# Patient Record
Sex: FEMALE | Race: WHITE | URBAN - METROPOLITAN AREA
[De-identification: names, ages, dates, MRNs, and addresses within clinical notes are randomized per-mention and may not be internally consistent; named-entity substitution may affect disease eponyms.]

---

## 2017-08-03 ENCOUNTER — EMERGENCY (EMERGENCY)
Facility: HOSPITAL | Age: 52
LOS: 1 days | Discharge: HOME | End: 2017-08-03
Attending: EMERGENCY MEDICINE
Payer: COMMERCIAL

## 2017-08-03 VITALS
HEIGHT: 62 IN | RESPIRATION RATE: 18 BRPM | OXYGEN SATURATION: 100 % | TEMPERATURE: 98 F | DIASTOLIC BLOOD PRESSURE: 77 MMHG | WEIGHT: 108.03 LBS | HEART RATE: 70 BPM | SYSTOLIC BLOOD PRESSURE: 133 MMHG

## 2017-08-03 VITALS
RESPIRATION RATE: 18 BRPM | HEART RATE: 62 BPM | DIASTOLIC BLOOD PRESSURE: 80 MMHG | SYSTOLIC BLOOD PRESSURE: 104 MMHG | OXYGEN SATURATION: 100 %

## 2017-08-03 LAB
ALBUMIN SERPL ELPH-MCNC: 4.5 G/DL — SIGNIFICANT CHANGE UP (ref 3.3–5.2)
ALP SERPL-CCNC: 81 U/L — SIGNIFICANT CHANGE UP (ref 40–120)
ALT FLD-CCNC: 15 U/L — SIGNIFICANT CHANGE UP
ANION GAP SERPL CALC-SCNC: 14 MMOL/L — SIGNIFICANT CHANGE UP (ref 5–17)
APTT BLD: 30.3 SEC — SIGNIFICANT CHANGE UP (ref 27.5–37.4)
AST SERPL-CCNC: 18 U/L — SIGNIFICANT CHANGE UP
BASOPHILS # BLD AUTO: 0 K/UL — SIGNIFICANT CHANGE UP (ref 0–0.2)
BASOPHILS NFR BLD AUTO: 0.6 % — SIGNIFICANT CHANGE UP (ref 0–2)
BILIRUB SERPL-MCNC: 0.3 MG/DL — LOW (ref 0.4–2)
BUN SERPL-MCNC: 14 MG/DL — SIGNIFICANT CHANGE UP (ref 8–20)
CALCIUM SERPL-MCNC: 9.3 MG/DL — SIGNIFICANT CHANGE UP (ref 8.6–10.2)
CHLORIDE SERPL-SCNC: 100 MMOL/L — SIGNIFICANT CHANGE UP (ref 98–107)
CO2 SERPL-SCNC: 28 MMOL/L — SIGNIFICANT CHANGE UP (ref 22–29)
CREAT SERPL-MCNC: 0.56 MG/DL — SIGNIFICANT CHANGE UP (ref 0.5–1.3)
EOSINOPHIL # BLD AUTO: 0.1 K/UL — SIGNIFICANT CHANGE UP (ref 0–0.5)
EOSINOPHIL NFR BLD AUTO: 2.4 % — SIGNIFICANT CHANGE UP (ref 0–6)
GLUCOSE SERPL-MCNC: 99 MG/DL — SIGNIFICANT CHANGE UP (ref 70–115)
HCT VFR BLD CALC: 35.4 % — LOW (ref 37–47)
HGB BLD-MCNC: 12 G/DL — SIGNIFICANT CHANGE UP (ref 12–16)
INR BLD: 1.08 RATIO — SIGNIFICANT CHANGE UP (ref 0.88–1.16)
LYMPHOCYTES # BLD AUTO: 1.3 K/UL — SIGNIFICANT CHANGE UP (ref 1–4.8)
LYMPHOCYTES # BLD AUTO: 23.6 % — SIGNIFICANT CHANGE UP (ref 20–55)
MCHC RBC-ENTMCNC: 30.8 PG — SIGNIFICANT CHANGE UP (ref 27–31)
MCHC RBC-ENTMCNC: 33.9 G/DL — SIGNIFICANT CHANGE UP (ref 32–36)
MCV RBC AUTO: 90.8 FL — SIGNIFICANT CHANGE UP (ref 81–99)
MONOCYTES # BLD AUTO: 0.3 K/UL — SIGNIFICANT CHANGE UP (ref 0–0.8)
MONOCYTES NFR BLD AUTO: 6.4 % — SIGNIFICANT CHANGE UP (ref 3–10)
NEUTROPHILS # BLD AUTO: 3.6 K/UL — SIGNIFICANT CHANGE UP (ref 1.8–8)
NEUTROPHILS NFR BLD AUTO: 66.6 % — SIGNIFICANT CHANGE UP (ref 37–73)
NT-PROBNP SERPL-SCNC: 88 PG/ML — SIGNIFICANT CHANGE UP (ref 0–300)
PLATELET # BLD AUTO: 242 K/UL — SIGNIFICANT CHANGE UP (ref 150–400)
POTASSIUM SERPL-MCNC: 4.1 MMOL/L — SIGNIFICANT CHANGE UP (ref 3.5–5.3)
POTASSIUM SERPL-SCNC: 4.1 MMOL/L — SIGNIFICANT CHANGE UP (ref 3.5–5.3)
PROT SERPL-MCNC: 7.2 G/DL — SIGNIFICANT CHANGE UP (ref 6.6–8.7)
PROTHROM AB SERPL-ACNC: 11.9 SEC — SIGNIFICANT CHANGE UP (ref 9.8–12.7)
RBC # BLD: 3.9 M/UL — LOW (ref 4.4–5.2)
RBC # FLD: 12.9 % — SIGNIFICANT CHANGE UP (ref 11–15.6)
SODIUM SERPL-SCNC: 142 MMOL/L — SIGNIFICANT CHANGE UP (ref 135–145)
TROPONIN T SERPL-MCNC: <0.01 NG/ML — SIGNIFICANT CHANGE UP (ref 0–0.06)
WBC # BLD: 5.4 K/UL — SIGNIFICANT CHANGE UP (ref 4.8–10.8)
WBC # FLD AUTO: 5.4 K/UL — SIGNIFICANT CHANGE UP (ref 4.8–10.8)

## 2017-08-03 PROCEDURE — 99285 EMERGENCY DEPT VISIT HI MDM: CPT

## 2017-08-03 PROCEDURE — 71020: CPT | Mod: 26

## 2017-08-03 PROCEDURE — 99284 EMERGENCY DEPT VISIT MOD MDM: CPT

## 2017-08-03 PROCEDURE — 84702 CHORIONIC GONADOTROPIN TEST: CPT

## 2017-08-03 PROCEDURE — 83880 ASSAY OF NATRIURETIC PEPTIDE: CPT

## 2017-08-03 PROCEDURE — 93005 ELECTROCARDIOGRAM TRACING: CPT

## 2017-08-03 PROCEDURE — 75574 CT ANGIO HRT W/3D IMAGE: CPT | Mod: 26

## 2017-08-03 PROCEDURE — 75574 CT ANGIO HRT W/3D IMAGE: CPT

## 2017-08-03 PROCEDURE — 93010 ELECTROCARDIOGRAM REPORT: CPT

## 2017-08-03 PROCEDURE — 36415 COLL VENOUS BLD VENIPUNCTURE: CPT

## 2017-08-03 PROCEDURE — 85027 COMPLETE CBC AUTOMATED: CPT

## 2017-08-03 PROCEDURE — 84484 ASSAY OF TROPONIN QUANT: CPT

## 2017-08-03 PROCEDURE — 85610 PROTHROMBIN TIME: CPT

## 2017-08-03 PROCEDURE — 80053 COMPREHEN METABOLIC PANEL: CPT

## 2017-08-03 PROCEDURE — 71046 X-RAY EXAM CHEST 2 VIEWS: CPT

## 2017-08-03 PROCEDURE — 85730 THROMBOPLASTIN TIME PARTIAL: CPT

## 2017-08-03 RX ORDER — ASPIRIN/CALCIUM CARB/MAGNESIUM 324 MG
325 TABLET ORAL ONCE
Qty: 0 | Refills: 0 | Status: COMPLETED | OUTPATIENT
Start: 2017-08-03 | End: 2017-08-03

## 2017-08-03 RX ORDER — SODIUM CHLORIDE 9 MG/ML
3 INJECTION INTRAMUSCULAR; INTRAVENOUS; SUBCUTANEOUS ONCE
Qty: 0 | Refills: 0 | Status: COMPLETED | OUTPATIENT
Start: 2017-08-03 | End: 2017-08-03

## 2017-08-03 RX ORDER — METOPROLOL TARTRATE 50 MG
25 TABLET ORAL ONCE
Qty: 0 | Refills: 0 | Status: COMPLETED | OUTPATIENT
Start: 2017-08-03 | End: 2017-08-03

## 2017-08-03 RX ADMIN — Medication 25 MILLIGRAM(S): at 14:11

## 2017-08-03 RX ADMIN — Medication 325 MILLIGRAM(S): at 14:11

## 2017-08-03 RX ADMIN — SODIUM CHLORIDE 3 MILLILITER(S): 9 INJECTION INTRAMUSCULAR; INTRAVENOUS; SUBCUTANEOUS at 12:51

## 2017-08-03 NOTE — CONSULT NOTE ADULT - SUBJECTIVE AND OBJECTIVE BOX
Patient is a 52y old  Female who presents with a chief complaint of     HPI:        PAST MEDICAL & SURGICAL HISTORY:  No pertinent past medical history  No significant past surgical history      PREVIOUS DIAGNOSTIC TESTING:          Allergies    Demerol HCl (Unknown)    Intolerances        MEDICATIONS  (STANDING):    MEDICATIONS  (PRN):      FAMILY HISTORY:      SOCIAL HISTORY:    CIGARETTES:    ALCOHOL:    REVIEW OF SYSTEMS:  CONSTITUTIONAL: No fever, weight loss, or fatigue  EYES: No eye pain, visual disturbances, or discharge  ENMT:  No difficulty hearing, tinnitus, vertigo; No sinus or throat pain  NECK: No pain or stiffness  RESPIRATORY: No cough, wheezing, chills or hemoptysis; No Shortness of Breath  CARDIOVASCULAR: No chest pain, palpitations, passing out, dizziness, or leg swelling  GASTROINTESTINAL: No abdominal or epigastric pain. No nausea, vomiting, or hematemesis; No diarrhea or constipation. No melena or hematochezia.  GENITOURINARY: No dysuria, frequency, hematuria, or incontinence  NEUROLOGICAL: No headaches, memory loss, loss of strength, numbness, or tremors  SKIN: No itching, burning, rashes, or lesions   LYMPH Nodes: No enlarged glands  ENDOCRINE: No heat or cold intolerance; No hair loss  MUSCULOSKELETAL: No joint pain or swelling; No muscle, back, or extremity pain  PSYCHIATRIC: No depression, anxiety, mood swings, or difficulty sleeping  HEME/LYMPH: No easy bruising, or bleeding gums  ALLERY AND IMMUNOLOGIC: No hives or eczema	    Vital Signs Last 24 Hrs  T(C): 36.9 (03 Aug 2017 12:06), Max: 36.9 (03 Aug 2017 12:06)  T(F): 98.4 (03 Aug 2017 12:06), Max: 98.4 (03 Aug 2017 12:06)  HR: 70 (03 Aug 2017 12:06) (70 - 70)  BP: 133/77 (03 Aug 2017 12:06) (133/77 - 133/77)  BP(mean): --  RR: 18 (03 Aug 2017 12:06) (18 - 18)  SpO2: 100% (03 Aug 2017 12:06) (100% - 100%)    Daily Height in cm: 157.48 (03 Aug 2017 12:06)    Daily     I&O's Detail      PHYSICAL EXAM:  Appearance: Normal, well nourished	  HEENT:   Normal oral mucosa, PERRL, EOMI, sclera non-icteric	  Lymphatic: No cervical lymphadenopathy  Cardiovascular: Normal S1 S2, No JVD, No cardiac murmurs, No carotid bruits, No peripheral edema  Respiratory: Lungs clear to auscultation	  Psychiatry: A & O x 3, Mood & affect appropriate  Gastrointestinal:  Soft, Non-tender, + BS, no bruits	  Skin: No rashes, No ecchymoses, No cyanosis  Neurologic: Grossly non-focal with full strength in all four extremities  Extremities: Normal range of motion, No clubbing, cyanosis or edema  Vascular: Peripheral pulses palpable 2+ bilaterally      INTERPRETATION OF TELEMETRY:    ECG:    LABS:                        12.0   5.4   )-----------( 242      ( 03 Aug 2017 12:40 )             35.4     08-03    142  |  100  |  14.0  ----------------------------<  99  4.1   |  28.0  |  0.56    Ca    9.3      03 Aug 2017 12:40    TPro  7.2  /  Alb  4.5  /  TBili  0.3<L>  /  DBili  x   /  AST  18  /  ALT  15  /  AlkPhos  81  08-03    CARDIAC MARKERS ( 03 Aug 2017 12:40 )  x     / <0.01 ng/mL / x     / x     / x          PT/INR - ( 03 Aug 2017 12:40 )   PT: 11.9 sec;   INR: 1.08 ratio         PTT - ( 03 Aug 2017 12:40 )  PTT:30.3 sec    I&O's Summary    BNP  RADIOLOGY & ADDITIONAL STUDIES: Patient is a 52y old  Female who presents with a chief complaint of     HPI:  51 y/o F with no known CAD presents with recurrent non-radiating sscp over the past few weeks. CP is not associated with exertion or relieved by rest, episodes lasts 15-30 minutes patient denies orthopnea, PND, LE edema syncope or pre-syncope . Patient saw PCP yesterday had ECG and was told it was abnomralhere is no FH of SCD or familial CMP. All systems reviewed otherwise negative except as above.  Co-morbid: (-) DM (-) DLD (-) CVA, (-) bleeding or clotting disorder, COPD or kidney disease. IN ED vss; 12 lead ECG  SR no acute injury or ischemic pattern , patient reports she is undergo stressful circumstances at home due to multiple family members with chronic medical illnesses.        PAST MEDICAL & SURGICAL HISTORY:  No pertinent past medical history  No significant past surgical history      PREVIOUS DIAGNOSTIC TESTING:          Allergies    Demerol HCl (Unknown)    Intolerances        MEDICATIONS  (STANDING):    MEDICATIONS  (PRN):      FAMILY HISTORY: (-) FH of SCD      SOCIAL HISTORY: Lives with family    CIGARETTES:None    ALCOHOL:None    REVIEW OF SYSTEMS:  CONSTITUTIONAL: No fever, weight loss, or fatigue  EYES: No eye pain, visual disturbances, or discharge  ENMT:  No difficulty hearing, tinnitus, vertigo; No sinus or throat pain  NECK: No pain or stiffness  RESPIRATORY: No cough, wheezing, chills or hemoptysis; No Shortness of Breath  CARDIOVASCULAR: No chest pain, palpitations, passing out, dizziness, or leg swelling  GASTROINTESTINAL: No abdominal or epigastric pain. No nausea, vomiting, or hematemesis; No diarrhea or constipation. No melena or hematochezia.  GENITOURINARY: No dysuria, frequency, hematuria, or incontinence  NEUROLOGICAL: No headaches, memory loss, loss of strength, numbness, or tremors  SKIN: No itching, burning, rashes, or lesions   LYMPH Nodes: No enlarged glands  ENDOCRINE: No heat or cold intolerance; No hair loss  MUSCULOSKELETAL: No joint pain or swelling; No muscle, back, or extremity pain  PSYCHIATRIC: No depression, anxiety, mood swings, or difficulty sleeping  HEME/LYMPH: No easy bruising, or bleeding gums  ALLERY AND IMMUNOLOGIC: No hives or eczema	    Vital Signs Last 24 Hrs  T(C): 36.9 (03 Aug 2017 12:06), Max: 36.9 (03 Aug 2017 12:06)  T(F): 98.4 (03 Aug 2017 12:06), Max: 98.4 (03 Aug 2017 12:06)  HR: 70 (03 Aug 2017 12:06) (70 - 70)  BP: 133/77 (03 Aug 2017 12:06) (133/77 - 133/77)  BP(mean): --  RR: 18 (03 Aug 2017 12:06) (18 - 18)  SpO2: 100% (03 Aug 2017 12:06) (100% - 100%)    Daily Height in cm: 157.48 (03 Aug 2017 12:06)    Daily     I&O's Detail      PHYSICAL EXAM:  Appearance: Normal, well nourished	  HEENT:   Normal oral mucosa, PERRL, EOMI, sclera non-icteric	  Lymphatic: No cervical lymphadenopathy  Cardiovascular: Normal S1 S2, No JVD, No cardiac murmurs, No carotid bruits, No peripheral edema  Respiratory: Lungs clear to auscultation	  Psychiatry: A & O x 3, Mood & affect appropriate  Gastrointestinal:  Soft, Non-tender, + BS, no bruits	  Skin: No rashes, No ecchymoses, No cyanosis  Neurologic: Grossly non-focal with full strength in all four extremities  Extremities: Normal range of motion, No clubbing, cyanosis or edema  Vascular: Peripheral pulses palpable 2+ bilaterally      INTERPRETATION OF TELEMETRY:    ECG:SR     LABS:                        12.0   5.4   )-----------( 242      ( 03 Aug 2017 12:40 )             35.4     08-03    142  |  100  |  14.0  ----------------------------<  99  4.1   |  28.0  |  0.56    Ca    9.3      03 Aug 2017 12:40    TPro  7.2  /  Alb  4.5  /  TBili  0.3<L>  /  DBili  x   /  AST  18  /  ALT  15  /  AlkPhos  81  08-03    CARDIAC MARKERS ( 03 Aug 2017 12:40 )  x     / <0.01 ng/mL / x     / x     / x          PT/INR - ( 03 Aug 2017 12:40 )   PT: 11.9 sec;   INR: 1.08 ratio         PTT - ( 03 Aug 2017 12:40 )  PTT:30.3 sec    I&O's Summary    BNP  RADIOLOGY & ADDITIONAL STUDIES:

## 2017-08-03 NOTE — ED ADULT NURSE NOTE - OBJECTIVE STATEMENT
pt reports chest pain intermittently x few weeks, pt is awake alert and oriented. states was sent in by her PMD due to increased K of 6.8. no SOB noted, denies body aches/cramping or palpitations. pt in no apparent distress, states she thinks her pain is related to stress, currently has a son undergoing chemo and a  in dialysis. pt skin warm dry and intact, denies fevers or recent illness. no extremity edema noted.

## 2017-08-03 NOTE — CONSULT NOTE ADULT - ASSESSMENT
53 y/o F with no known CAD presents with recurrent non-radiating sscp over the past few weeks. CP is not associated with exertion or relieved by rest, episodes lasts 15-30 minutes patient denies orthopnea, PND, LE edema syncope or pre-syncope . Patient saw PCP yesterday had ECG and was told it was abnomralhere is no FH of SCD or familial CMP. All systems reviewed otherwise negative except as above.  Co-morbid: (-) DM (-) DLD (-) CVA, (-) bleeding or clotting disorder, COPD or kidney disease. IN ED vss; 12 lead ECG  SR no acute injury or ischemic pattern , patient reports she is undergo stressful circumstances at home due to multiple family members with chronic medical illnesses.    CP: Low pre-test probabilty for ACS? musculo-skeltal ecc trend-biomarkers x 2,check chest xray,  cardiac CT r/o obstructive CAD, BB preprocedure for HR control.

## 2017-08-03 NOTE — ED PROVIDER NOTE - OBJECTIVE STATEMENT
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

## 2023-08-05 ENCOUNTER — OFFICE (OUTPATIENT)
Dept: URBAN - METROPOLITAN AREA CLINIC 104 | Facility: CLINIC | Age: 58
Setting detail: OPHTHALMOLOGY
End: 2023-08-05
Payer: COMMERCIAL

## 2023-08-05 DIAGNOSIS — H43.393: ICD-10-CM

## 2023-08-05 DIAGNOSIS — H16.223: ICD-10-CM

## 2023-08-05 DIAGNOSIS — H25.13: ICD-10-CM

## 2023-08-05 DIAGNOSIS — H40.033: ICD-10-CM

## 2023-08-05 PROCEDURE — 92014 COMPRE OPH EXAM EST PT 1/>: CPT | Performed by: OPTOMETRIST

## 2023-08-05 ASSESSMENT — REFRACTION_AUTOREFRACTION
OD_CYLINDER: -0.50
OS_CYLINDER: -0.25
OD_SPHERE: PLANO
OD_AXIS: 077
OS_SPHERE: PLANO
OS_AXIS: 094

## 2023-08-05 ASSESSMENT — KERATOMETRY
OS_AXISANGLE_DEGREES: 109
OD_AXISANGLE_DEGREES: 125
OS_K2POWER_DIOPTERS: 45.24
OD_K2POWER_DIOPTERS: 45.06
OS_K1POWER_DIOPTERS: 45.12
OD_K1POWER_DIOPTERS: 44.76

## 2023-08-05 ASSESSMENT — LID EXAM ASSESSMENTS
OS_BLEPHARITIS: LLL LUL 1+
OD_BLEPHARITIS: RLL RUL 1+

## 2023-08-05 ASSESSMENT — VISUAL ACUITY
OS_BCVA: 20/20-2
OD_BCVA: 20/20-2

## 2023-08-05 ASSESSMENT — TONOMETRY
OD_IOP_MMHG: 14
OS_IOP_MMHG: 14

## 2023-08-05 ASSESSMENT — REFRACTION_CURRENTRX
OD_OVR_VA: 20/
OS_OVR_VA: 20/
OS_OVR_VA: 20/
OD_OVR_VA: 20/

## 2023-12-31 ENCOUNTER — NON-APPOINTMENT (OUTPATIENT)
Age: 58
End: 2023-12-31

## 2024-02-16 ENCOUNTER — OFFICE (OUTPATIENT)
Dept: URBAN - METROPOLITAN AREA CLINIC 104 | Facility: CLINIC | Age: 59
Setting detail: OPHTHALMOLOGY
End: 2024-02-16
Payer: COMMERCIAL

## 2024-02-16 ENCOUNTER — RX ONLY (RX ONLY)
Age: 59
End: 2024-02-16

## 2024-02-16 DIAGNOSIS — H00.11: ICD-10-CM

## 2024-02-16 PROCEDURE — 99213 OFFICE O/P EST LOW 20 MIN: CPT | Performed by: OPTOMETRIST

## 2024-02-16 ASSESSMENT — LID EXAM ASSESSMENTS
OD_BLEPHARITIS: RLL RUL 1+
OS_BLEPHARITIS: LLL LUL 1+

## 2024-02-16 ASSESSMENT — CONFRONTATIONAL VISUAL FIELD TEST (CVF)
OD_FINDINGS: FULL
OS_FINDINGS: FULL

## 2024-02-16 ASSESSMENT — REFRACTION_AUTOREFRACTION
OD_CYLINDER: -0.50
OS_CYLINDER: -0.25
OD_SPHERE: PLANO
OS_AXIS: 094
OS_SPHERE: PLANO
OD_AXIS: 077

## 2024-03-06 ENCOUNTER — APPOINTMENT (OUTPATIENT)
Dept: ORTHOPEDIC SURGERY | Facility: CLINIC | Age: 59
End: 2024-03-06
Payer: COMMERCIAL

## 2024-03-06 VITALS — BODY MASS INDEX: 21.2 KG/M2 | HEIGHT: 60 IN | WEIGHT: 108 LBS

## 2024-03-06 DIAGNOSIS — M81.0 AGE-RELATED OSTEOPOROSIS W/OUT CURRENT PATHOLOGICAL FRACTURE: ICD-10-CM

## 2024-03-06 PROCEDURE — 99203 OFFICE O/P NEW LOW 30 MIN: CPT | Mod: 25

## 2024-03-06 PROCEDURE — 72110 X-RAY EXAM L-2 SPINE 4/>VWS: CPT

## 2024-03-06 RX ORDER — MELOXICAM 15 MG/1
15 TABLET ORAL DAILY
Qty: 30 | Refills: 0 | Status: ACTIVE | COMMUNITY
Start: 2024-03-06 | End: 1900-01-01

## 2024-03-06 RX ORDER — IBANDRONATE SODIUM 150 MG/1
TABLET, FILM COATED ORAL
Refills: 0 | Status: ACTIVE | COMMUNITY

## 2024-03-07 RX ORDER — CYCLOBENZAPRINE HYDROCHLORIDE 5 MG/1
5 TABLET, FILM COATED ORAL 3 TIMES DAILY
Qty: 30 | Refills: 0 | Status: ACTIVE | COMMUNITY
Start: 2024-03-07 | End: 1900-01-01

## 2024-03-07 NOTE — IMAGING
[Facet arthropathy] : Facet arthropathy [Disc space narrowing] : Disc space narrowing [No instability seen on flexion/extension] : No instability seen on flexion/extension [FreeTextEntry1] : T11, L1 and L5 VCF likely chronic
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
## 2024-03-07 NOTE — ASSESSMENT
[FreeTextEntry1] : 59F with X of 1 and L5 VCF.  X_rays today showed new T11 VCF.  Pain is more radicular  and in her lunboscaral juncitonthen up by T11. PT  We will also provide a prescription for anti-inflammatories.  Discussed major side effects of medication including but not limited to gastritis and acute kidney injury.  She was instructed to take with food and to discontinue use if stomach or esophageal pain developed. FU 6 weeks

## 2024-03-11 ENCOUNTER — APPOINTMENT (OUTPATIENT)
Dept: ORTHOPEDIC SURGERY | Facility: CLINIC | Age: 59
End: 2024-03-11

## 2024-04-23 ENCOUNTER — APPOINTMENT (OUTPATIENT)
Dept: ORTHOPEDIC SURGERY | Facility: CLINIC | Age: 59
End: 2024-04-23
Payer: COMMERCIAL

## 2024-04-23 DIAGNOSIS — M54.50 LOW BACK PAIN, UNSPECIFIED: ICD-10-CM

## 2024-04-23 DIAGNOSIS — Z85.828 PERSONAL HISTORY OF OTHER MALIGNANT NEOPLASM OF SKIN: ICD-10-CM

## 2024-04-23 PROCEDURE — 99213 OFFICE O/P EST LOW 20 MIN: CPT
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
## 2024-04-23 NOTE — ASSESSMENT
[FreeTextEntry1] : 59F with X of 1 and L5 VCF.  X-0ays today showed new T11 VCF.  Pain is more radicular  and in her lumbosacral junction rather than up by T11..  Has been going to PT with partial but not complete relief MRI T and L spine rule out HNP vs VCF Fu after MRIs

## 2024-04-25 ENCOUNTER — TRANSCRIPTION ENCOUNTER (OUTPATIENT)
Age: 59
End: 2024-04-25

## 2024-04-25 ENCOUNTER — APPOINTMENT (OUTPATIENT)
Dept: MRI IMAGING | Facility: CLINIC | Age: 59
End: 2024-04-25
Payer: COMMERCIAL

## 2024-04-25 PROCEDURE — 72146 MRI CHEST SPINE W/O DYE: CPT

## 2024-04-25 PROCEDURE — 72148 MRI LUMBAR SPINE W/O DYE: CPT

## 2024-05-10 ENCOUNTER — APPOINTMENT (OUTPATIENT)
Dept: ORTHOPEDIC SURGERY | Facility: CLINIC | Age: 59
End: 2024-05-10

## 2024-05-13 ENCOUNTER — APPOINTMENT (OUTPATIENT)
Dept: ORTHOPEDIC SURGERY | Facility: CLINIC | Age: 59
End: 2024-05-13
Payer: COMMERCIAL

## 2024-05-13 DIAGNOSIS — S22.080S WEDGE COMPRESSION FRACTURE OF T11-T12 VERTEBRA, SEQUELA: ICD-10-CM

## 2024-05-13 DIAGNOSIS — S22.080D WEDGE COMPRESSION FRACTURE OF T11-T12 VERTEBRA, SUBSEQUENT ENCOUNTER FOR FRACTURE WITH ROUTINE HEALING: ICD-10-CM

## 2024-05-13 PROCEDURE — 99213 OFFICE O/P EST LOW 20 MIN: CPT

## 2024-05-13 NOTE — ASSESSMENT
[FreeTextEntry1] : 59F with X of 1 and L5 VCF.  X-0ays today showed new T11 VCF.  Pain is more radicular  and in her lumbosacral junction rather than up by T11..  Acute VCF on MRI Pain has bene gradually improving. Discussed continued conservative care Trial of bracing with TLSO rx provided to help with stability and pain control. Prevents further kyphosis c/w PT IF pain persists sat next visit consider kyphoplasty

## 2024-05-13 NOTE — DATA REVIEWED
[Thoracic Spine] : thoracic spine [MRI] : MRI [Lumbar Spine] : lumbar spine [I independently reviewed and interpreted images and report] : I independently reviewed and interpreted images and report [FreeTextEntry1] : Acute T11 and T12 VCF  [FreeTextEntry2] : Cronc L1 and L5 VCF
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
## 2024-05-28 ENCOUNTER — RX RENEWAL (OUTPATIENT)
Age: 59
End: 2024-05-28

## 2024-05-28 RX ORDER — DICLOFENAC SODIUM 75 MG/1
75 TABLET, DELAYED RELEASE ORAL
Qty: 60 | Refills: 0 | Status: ACTIVE | COMMUNITY
Start: 2024-03-27 | End: 1900-01-01

## 2024-05-28 RX ORDER — DICLOFENAC SODIUM 75 MG/1
75 TABLET, DELAYED RELEASE ORAL TWICE DAILY
Qty: 30 | Refills: 2 | Status: ACTIVE | COMMUNITY
Start: 2024-05-28 | End: 1900-01-01

## 2024-06-24 ENCOUNTER — APPOINTMENT (OUTPATIENT)
Dept: ORTHOPEDIC SURGERY | Facility: CLINIC | Age: 59
End: 2024-06-24

## 2024-07-16 ENCOUNTER — OUTPATIENT (OUTPATIENT)
Dept: OUTPATIENT SERVICES | Facility: HOSPITAL | Age: 59
LOS: 1 days | End: 2024-07-16

## 2024-07-16 VITALS
HEIGHT: 60 IN | TEMPERATURE: 98 F | HEART RATE: 66 BPM | WEIGHT: 104.94 LBS | OXYGEN SATURATION: 98 % | RESPIRATION RATE: 16 BRPM | DIASTOLIC BLOOD PRESSURE: 72 MMHG | SYSTOLIC BLOOD PRESSURE: 111 MMHG

## 2024-07-16 DIAGNOSIS — S01.309A UNSPECIFIED OPEN WOUND OF UNSPECIFIED EAR, INITIAL ENCOUNTER: ICD-10-CM

## 2024-07-16 DIAGNOSIS — Z98.890 OTHER SPECIFIED POSTPROCEDURAL STATES: Chronic | ICD-10-CM

## 2024-07-16 DIAGNOSIS — C44.91 BASAL CELL CARCINOMA OF SKIN, UNSPECIFIED: ICD-10-CM

## 2024-07-16 NOTE — H&P PST ADULT - NSICDXPASTMEDICALHX_GEN_ALL_CORE_FT
PAST MEDICAL HISTORY:  Basal cell carcinoma (BCC)     Osteoporosis     SCC (squamous cell carcinoma)     Seasonal allergies      PAST MEDICAL HISTORY:  Basal cell carcinoma (BCC)     Osteoporosis     SCC (squamous cell carcinoma)     Seasonal allergies     Thoracic compression fracture

## 2024-07-16 NOTE — H&P PST ADULT - NSICDXFAMILYHX_GEN_ALL_CORE_FT
FAMILY HISTORY:  Father  Still living? No  FH: lung cancer, Age at diagnosis: Age Unknown    Mother  Still living? No  Family history of pulmonary fibrosis, Age at diagnosis: Age Unknown

## 2024-07-16 NOTE — H&P PST ADULT - NSANTHOSAYNRD_GEN_A_CORE
No
No. ÁNGEL screening performed.  STOP BANG Legend: 0-2 = LOW Risk; 3-4 = INTERMEDIATE Risk; 5-8 = HIGH Risk

## 2024-07-16 NOTE — H&P PST ADULT - PROBLEM SELECTOR PLAN 1
Pt. is scheduled for FLAP reconstruction left ear 8/2/24.  Pt. verbalized understanding of instructions.

## 2024-08-02 ENCOUNTER — TRANSCRIPTION ENCOUNTER (OUTPATIENT)
Age: 59
End: 2024-08-02

## 2024-08-02 ENCOUNTER — OUTPATIENT (OUTPATIENT)
Dept: OUTPATIENT SERVICES | Facility: HOSPITAL | Age: 59
LOS: 1 days | Discharge: HOME | End: 2024-08-02

## 2024-08-02 VITALS
DIASTOLIC BLOOD PRESSURE: 75 MMHG | OXYGEN SATURATION: 99 % | RESPIRATION RATE: 15 BRPM | SYSTOLIC BLOOD PRESSURE: 94 MMHG | HEART RATE: 87 BPM | TEMPERATURE: 98 F

## 2024-08-02 VITALS
TEMPERATURE: 98 F | RESPIRATION RATE: 16 BRPM | HEART RATE: 72 BPM | OXYGEN SATURATION: 98 % | DIASTOLIC BLOOD PRESSURE: 66 MMHG | WEIGHT: 104.94 LBS | SYSTOLIC BLOOD PRESSURE: 109 MMHG | HEIGHT: 60 IN

## 2024-08-02 DIAGNOSIS — Z98.890 OTHER SPECIFIED POSTPROCEDURAL STATES: Chronic | ICD-10-CM

## 2024-08-02 DIAGNOSIS — S01.309A UNSPECIFIED OPEN WOUND OF UNSPECIFIED EAR, INITIAL ENCOUNTER: ICD-10-CM

## 2024-08-02 RX ORDER — CETIRIZINE HCL 10 MG
1 TABLET,CHEWABLE ORAL
Refills: 0 | DISCHARGE

## 2024-08-02 RX ORDER — ALENDRONATE SODIUM 35 MG/1
1 TABLET ORAL
Refills: 0 | DISCHARGE

## 2024-08-02 NOTE — BRIEF OPERATIVE NOTE - OPERATION/FINDINGS
right earlobe wedge excision and repair; left ear helical reconstruction with adjacent tissue rearrangement (Antia-Buch flap)

## 2024-08-02 NOTE — ASU DISCHARGE PLAN (ADULT/PEDIATRIC) - CARE PROVIDER_API CALL
Waldemar Gonzalez.  Plastic Surgery  [address] [place] [id number]  Phone: [phone]  Fax: [phone]  Scheduled Appointment: 08/03/2024

## 2024-08-02 NOTE — ASU DISCHARGE PLAN (ADULT/PEDIATRIC) - ASU DC SPECIAL INSTRUCTIONSFT
Follow up with Dr. Gonzalez tomorrow (Saturday) morning at 10 AM. Keep dressing clean, dry, intact until then. Tylenol as needed for pain.

## 2024-08-02 NOTE — ASU DISCHARGE PLAN (ADULT/PEDIATRIC) - NS MD DC FALL RISK RISK
For information on Fall & Injury Prevention, visit: [email or web address] OR  [email or web address] OR  https://www.cdc.gov/steadi/patient.html

## 2024-08-02 NOTE — BRIEF OPERATIVE NOTE - NSICDXBRIEFPROCEDURE_GEN_ALL_CORE_FT
PROCEDURES:  Repair of right earlobe 02-Aug-2024 13:05:25  Bucky Blanca  Adjacent tissue transfer or rearrangement, ear, defect 10.1 sq cm to 30 sq cm 02-Aug-2024 13:05:55  Bucky Blanca

## 2025-02-18 ENCOUNTER — RX ONLY (RX ONLY)
Age: 60
End: 2025-02-18

## 2025-02-18 ENCOUNTER — OFFICE (OUTPATIENT)
Dept: URBAN - METROPOLITAN AREA CLINIC 6 | Facility: CLINIC | Age: 60
Setting detail: OPHTHALMOLOGY
End: 2025-02-18
Payer: COMMERCIAL

## 2025-02-18 DIAGNOSIS — H43.393: ICD-10-CM

## 2025-02-18 DIAGNOSIS — H43.811: ICD-10-CM

## 2025-02-18 DIAGNOSIS — H01.001: ICD-10-CM

## 2025-02-18 DIAGNOSIS — H16.223: ICD-10-CM

## 2025-02-18 DIAGNOSIS — H01.004: ICD-10-CM

## 2025-02-18 DIAGNOSIS — H25.13: ICD-10-CM

## 2025-02-18 PROCEDURE — 92014 COMPRE OPH EXAM EST PT 1/>: CPT | Performed by: OPHTHALMOLOGY

## 2025-02-18 ASSESSMENT — KERATOMETRY
OS_AXISANGLE_DEGREES: 109
OD_K1POWER_DIOPTERS: 44.76
OS_K1POWER_DIOPTERS: 45.12
OD_AXISANGLE_DEGREES: 125
OS_K2POWER_DIOPTERS: 45.24
OD_K2POWER_DIOPTERS: 45.06

## 2025-02-18 ASSESSMENT — REFRACTION_AUTOREFRACTION
OS_SPHERE: +0.25
OD_AXIS: 088
OS_AXIS: 094
OD_CYLINDER: -0.50
OS_CYLINDER: -0.25
OD_SPHERE: PLANO

## 2025-02-18 ASSESSMENT — TONOMETRY
OD_IOP_MMHG: 19
OS_IOP_MMHG: 20

## 2025-02-18 ASSESSMENT — VISUAL ACUITY
OS_BCVA: 20/25-2
OD_BCVA: 20/25+2

## 2025-02-18 ASSESSMENT — LID EXAM ASSESSMENTS
OS_BLEPHARITIS: LUL 1+
OD_BLEPHARITIS: RUL 1+

## 2025-02-18 ASSESSMENT — CONFRONTATIONAL VISUAL FIELD TEST (CVF)
OS_FINDINGS: FULL
OD_FINDINGS: FULL

## 2025-03-14 ENCOUNTER — OFFICE (OUTPATIENT)
Dept: URBAN - METROPOLITAN AREA CLINIC 6 | Facility: CLINIC | Age: 60
Setting detail: OPHTHALMOLOGY
End: 2025-03-14
Payer: COMMERCIAL

## 2025-03-14 DIAGNOSIS — H43.811: ICD-10-CM

## 2025-03-14 DIAGNOSIS — H25.13: ICD-10-CM

## 2025-03-14 DIAGNOSIS — H01.001: ICD-10-CM

## 2025-03-14 DIAGNOSIS — H43.393: ICD-10-CM

## 2025-03-14 DIAGNOSIS — H16.223: ICD-10-CM

## 2025-03-14 DIAGNOSIS — H01.004: ICD-10-CM

## 2025-03-14 PROBLEM — H52.7 REFRACTIVE ERROR: Status: ACTIVE | Noted: 2025-03-14

## 2025-03-14 PROCEDURE — 99213 OFFICE O/P EST LOW 20 MIN: CPT | Performed by: OPHTHALMOLOGY

## 2025-03-14 ASSESSMENT — KERATOMETRY
OD_K1POWER_DIOPTERS: 44.75
OD_AXISANGLE_DEGREES: 090
OD_K2POWER_DIOPTERS: 44.75
OS_K1POWER_DIOPTERS: 45.25
OS_K2POWER_DIOPTERS: 45.25
OS_AXISANGLE_DEGREES: 090

## 2025-03-14 ASSESSMENT — VISUAL ACUITY
OD_BCVA: 20/25+2
OS_BCVA: 20/25-2

## 2025-03-14 ASSESSMENT — CONFRONTATIONAL VISUAL FIELD TEST (CVF)
OD_FINDINGS: FULL
OS_FINDINGS: FULL

## 2025-03-14 ASSESSMENT — REFRACTION_AUTOREFRACTION
OD_AXIS: 101
OS_CYLINDER: -0.50
OS_AXIS: 097
OD_CYLINDER: -0.50
OD_SPHERE: 0.00
OS_SPHERE: +0.25

## 2025-03-14 ASSESSMENT — LID EXAM ASSESSMENTS
OD_BLEPHARITIS: RUL 1+
OS_BLEPHARITIS: LUL 1+

## 2025-03-14 ASSESSMENT — TONOMETRY
OD_IOP_MMHG: 14
OS_IOP_MMHG: 14

## 2025-05-31 ENCOUNTER — NON-APPOINTMENT (OUTPATIENT)
Age: 60
End: 2025-05-31

## (undated) DEVICE — DRAPE 3/4 SHEET 52X76"

## (undated) DEVICE — DRAPE SPLIT SHEET 77" X 120"

## (undated) DEVICE — DRAPE TOWEL BLUE 17" X 24"

## (undated) DEVICE — POSITIONER PATIENT SAFETY STRAP 3X60"

## (undated) DEVICE — ELCTR ROCKER SWITCH PENCIL BLUE 10FT

## (undated) DEVICE — WARMING BLANKET FULL ADULT

## (undated) DEVICE — CAM-ESU 1501367: Type: DURABLE MEDICAL EQUIPMENT

## (undated) DEVICE — POSITIONER FOAM EGG CRATE ULNAR 2PCS (PINK)

## (undated) DEVICE — VENODYNE/SCD SLEEVE CALF MEDIUM

## (undated) DEVICE — LABELS BLANK W PEN

## (undated) DEVICE — DRAPE INSTRUMENT POUCH 6.75" X 11"

## (undated) DEVICE — PREP BETADINE KIT

## (undated) DEVICE — GLV 7.5 PROTEXIS (WHITE)

## (undated) DEVICE — PACK MINOR NO DRAPE

## (undated) DEVICE — ELCTR GROUNDING PAD ADULT COVIDIEN

## (undated) DEVICE — BASIN SET DOUBLE

## (undated) DEVICE — SOL IRR POUR NS 0.9% 500ML

## (undated) DEVICE — ELCTR BOVIE TIP BLADE INSULATED 2.75" EDGE